# Patient Record
Sex: FEMALE | Race: WHITE | HISPANIC OR LATINO | Employment: UNEMPLOYED | URBAN - NONMETROPOLITAN AREA
[De-identification: names, ages, dates, MRNs, and addresses within clinical notes are randomized per-mention and may not be internally consistent; named-entity substitution may affect disease eponyms.]

---

## 2024-08-24 ENCOUNTER — HOSPITAL ENCOUNTER (EMERGENCY)
Facility: HOSPITAL | Age: 44
Discharge: HOME | End: 2024-08-24
Attending: FAMILY MEDICINE

## 2024-08-24 VITALS
OXYGEN SATURATION: 97 % | WEIGHT: 184 LBS | BODY MASS INDEX: 34.74 KG/M2 | DIASTOLIC BLOOD PRESSURE: 56 MMHG | HEART RATE: 108 BPM | HEIGHT: 61 IN | RESPIRATION RATE: 17 BRPM | SYSTOLIC BLOOD PRESSURE: 116 MMHG | TEMPERATURE: 99 F

## 2024-08-24 DIAGNOSIS — N12 PYELONEPHRITIS: Primary | ICD-10-CM

## 2024-08-24 LAB
ALBUMIN SERPL-MCNC: 4.7 G/DL (ref 3.4–5)
ALBUMIN/GLOB SERPL: 1.2 RATIO
ALP SERPL-CCNC: 92 UNIT/L (ref 50–144)
ALT SERPL-CCNC: 23 UNIT/L (ref 1–45)
ANION GAP SERPL CALC-SCNC: 10 MEQ/L (ref 2–13)
AST SERPL-CCNC: 28 UNIT/L (ref 14–36)
B-HCG UR QL: NEGATIVE
BACTERIA #/AREA URNS AUTO: ABNORMAL /HPF
BASOPHILS # BLD AUTO: 0.05 X10(3)/MCL (ref 0.01–0.08)
BASOPHILS NFR BLD AUTO: 0.3 % (ref 0.1–1.2)
BILIRUB SERPL-MCNC: 1.3 MG/DL (ref 0–1)
BILIRUB UR QL STRIP.AUTO: NEGATIVE
BUN SERPL-MCNC: 11 MG/DL (ref 7–20)
CALCIUM SERPL-MCNC: 9.5 MG/DL (ref 8.4–10.2)
CHLORIDE SERPL-SCNC: 102 MMOL/L (ref 98–110)
CLARITY UR: CLEAR
CO2 SERPL-SCNC: 25 MMOL/L (ref 21–32)
COLOR UR AUTO: ABNORMAL
CREAT SERPL-MCNC: 0.91 MG/DL (ref 0.66–1.25)
CREAT/UREA NIT SERPL: 12 (ref 12–20)
EOSINOPHIL # BLD AUTO: 0.04 X10(3)/MCL (ref 0.04–0.36)
EOSINOPHIL NFR BLD AUTO: 0.3 % (ref 0.7–7)
ERYTHROCYTE [DISTWIDTH] IN BLOOD BY AUTOMATED COUNT: 12.6 % (ref 11–14.5)
GFR SERPLBLD CREATININE-BSD FMLA CKD-EPI: 79 ML/MIN/1.73/M2
GLOBULIN SER-MCNC: 3.8 GM/DL (ref 2–3.9)
GLUCOSE SERPL-MCNC: 113 MG/DL (ref 70–115)
GLUCOSE UR QL STRIP: 100
HCT VFR BLD AUTO: 40.6 % (ref 36–48)
HGB BLD-MCNC: 13.6 G/DL (ref 11.8–16)
HGB UR QL STRIP: ABNORMAL
IMM GRANULOCYTES # BLD AUTO: 0.08 X10(3)/MCL (ref 0–0.03)
IMM GRANULOCYTES NFR BLD AUTO: 0.5 % (ref 0–0.5)
KETONES UR QL STRIP: 15
LEUKOCYTE ESTERASE UR QL STRIP: ABNORMAL
LIPASE SERPL-CCNC: 58 U/L (ref 23–300)
LYMPHOCYTES # BLD AUTO: 1.44 X10(3)/MCL (ref 1.16–3.74)
LYMPHOCYTES NFR BLD AUTO: 9 % (ref 20–55)
MCH RBC QN AUTO: 29 PG (ref 27–34)
MCHC RBC AUTO-ENTMCNC: 33.5 G/DL (ref 31–37)
MCV RBC AUTO: 86.6 FL (ref 79–99)
MONOCYTES # BLD AUTO: 0.38 X10(3)/MCL (ref 0.24–0.36)
MONOCYTES NFR BLD AUTO: 2.4 % (ref 4.7–12.5)
NEUTROPHILS # BLD AUTO: 13.95 X10(3)/MCL (ref 1.56–6.13)
NEUTROPHILS NFR BLD AUTO: 87.5 % (ref 37–73)
NITRITE UR QL STRIP: POSITIVE
NRBC BLD AUTO-RTO: 0 %
PH UR STRIP: 6 [PH]
PLATELET # BLD AUTO: 324 X10(3)/MCL (ref 140–371)
PMV BLD AUTO: 8.8 FL (ref 9.4–12.4)
POTASSIUM SERPL-SCNC: 3.6 MMOL/L (ref 3.5–5.1)
PROT SERPL-MCNC: 8.5 GM/DL (ref 6.3–8.2)
PROT UR QL STRIP: 100
RBC # BLD AUTO: 4.69 X10(6)/MCL (ref 4–5.1)
RBC #/AREA URNS AUTO: ABNORMAL /HPF
SODIUM SERPL-SCNC: 137 MMOL/L (ref 136–145)
SP GR UR STRIP.AUTO: 1.02 (ref 1–1.03)
SQUAMOUS #/AREA URNS AUTO: ABNORMAL /HPF
UROBILINOGEN UR STRIP-ACNC: 2
WBC # BLD AUTO: 15.94 X10(3)/MCL (ref 4–11.5)
WBC #/AREA URNS AUTO: ABNORMAL /HPF
YEAST UR QL AUTO: ABNORMAL /HPF

## 2024-08-24 PROCEDURE — 83690 ASSAY OF LIPASE: CPT | Performed by: FAMILY MEDICINE

## 2024-08-24 PROCEDURE — 81025 URINE PREGNANCY TEST: CPT | Performed by: FAMILY MEDICINE

## 2024-08-24 PROCEDURE — 81003 URINALYSIS AUTO W/O SCOPE: CPT | Performed by: FAMILY MEDICINE

## 2024-08-24 PROCEDURE — 87086 URINE CULTURE/COLONY COUNT: CPT | Performed by: FAMILY MEDICINE

## 2024-08-24 PROCEDURE — 96375 TX/PRO/DX INJ NEW DRUG ADDON: CPT

## 2024-08-24 PROCEDURE — 81015 MICROSCOPIC EXAM OF URINE: CPT | Performed by: FAMILY MEDICINE

## 2024-08-24 PROCEDURE — 87040 BLOOD CULTURE FOR BACTERIA: CPT | Performed by: FAMILY MEDICINE

## 2024-08-24 PROCEDURE — 96365 THER/PROPH/DIAG IV INF INIT: CPT

## 2024-08-24 PROCEDURE — 25500020 PHARM REV CODE 255: Performed by: FAMILY MEDICINE

## 2024-08-24 PROCEDURE — 63600175 PHARM REV CODE 636 W HCPCS: Performed by: FAMILY MEDICINE

## 2024-08-24 PROCEDURE — 99285 EMERGENCY DEPT VISIT HI MDM: CPT | Mod: 25

## 2024-08-24 PROCEDURE — 85025 COMPLETE CBC W/AUTO DIFF WBC: CPT | Performed by: FAMILY MEDICINE

## 2024-08-24 PROCEDURE — 25000003 PHARM REV CODE 250: Performed by: FAMILY MEDICINE

## 2024-08-24 PROCEDURE — 80053 COMPREHEN METABOLIC PANEL: CPT | Performed by: FAMILY MEDICINE

## 2024-08-24 RX ORDER — ONDANSETRON HYDROCHLORIDE 2 MG/ML
4 INJECTION, SOLUTION INTRAVENOUS
Status: COMPLETED | OUTPATIENT
Start: 2024-08-24 | End: 2024-08-24

## 2024-08-24 RX ORDER — NITROFURANTOIN 25; 75 MG/1; MG/1
100 CAPSULE ORAL 2 TIMES DAILY
Qty: 10 CAPSULE | Refills: 0 | Status: SHIPPED | OUTPATIENT
Start: 2024-08-24 | End: 2024-08-29

## 2024-08-24 RX ORDER — ACETAMINOPHEN 500 MG
1000 TABLET ORAL
Status: COMPLETED | OUTPATIENT
Start: 2024-08-24 | End: 2024-08-24

## 2024-08-24 RX ORDER — KETOROLAC TROMETHAMINE 30 MG/ML
30 INJECTION, SOLUTION INTRAMUSCULAR; INTRAVENOUS
Status: COMPLETED | OUTPATIENT
Start: 2024-08-24 | End: 2024-08-24

## 2024-08-24 RX ORDER — ONDANSETRON 4 MG/1
4 TABLET, ORALLY DISINTEGRATING ORAL EVERY 8 HOURS PRN
Qty: 20 TABLET | Refills: 0 | Status: SHIPPED | OUTPATIENT
Start: 2024-08-24

## 2024-08-24 RX ORDER — CEFTRIAXONE 1 G/1
1 INJECTION, POWDER, FOR SOLUTION INTRAMUSCULAR; INTRAVENOUS
Status: DISCONTINUED | OUTPATIENT
Start: 2024-08-24 | End: 2024-08-24

## 2024-08-24 RX ADMIN — CEFTRIAXONE SODIUM 1 G: 1 INJECTION, POWDER, FOR SOLUTION INTRAMUSCULAR; INTRAVENOUS at 05:08

## 2024-08-24 RX ADMIN — ONDANSETRON 4 MG: 2 INJECTION INTRAMUSCULAR; INTRAVENOUS at 04:08

## 2024-08-24 RX ADMIN — IOHEXOL 100 ML: 300 INJECTION, SOLUTION INTRAVENOUS at 04:08

## 2024-08-24 RX ADMIN — KETOROLAC TROMETHAMINE 30 MG: 30 INJECTION, SOLUTION INTRAMUSCULAR at 03:08

## 2024-08-24 RX ADMIN — ACETAMINOPHEN 1000 MG: 500 TABLET ORAL at 04:08

## 2024-08-24 NOTE — ED PROVIDER NOTES
Encounter Date: 8/24/2024       History       Chief Complaint  Patient presents with  · Abdominal Pain  · Nausea    Pt ambulatory to triage with c/o right upper quadrant  abd pain and nausea, onset this am.  Patient complains of abdominal pain which started this a.m. she is complaining of nausea no associated vomiting she is tender in the right upper quadrant possible cholelithiasis she has a white cell count elevation          Review of patient's allergies indicates:  No Known Allergies  History reviewed. No pertinent past medical history.  Past Surgical History:   Procedure Laterality Date    TUBAL LIGATION       No family history on file.  Social History     Tobacco Use    Smoking status: Never     Passive exposure: Never    Smokeless tobacco: Never   Substance Use Topics    Alcohol use: Not Currently    Drug use: Never     Review of Systems   Constitutional:  Negative for fever.   HENT:  Negative for congestion, dental problem, drooling, ear discharge and sore throat.    Eyes:  Negative for pain, discharge, redness and itching.   Respiratory:  Negative for apnea, chest tightness and shortness of breath.    Cardiovascular:  Negative for chest pain.   Gastrointestinal:  Positive for abdominal pain and nausea.   Endocrine: Negative for cold intolerance and heat intolerance.   Genitourinary:  Negative for difficulty urinating, dysuria and enuresis.   Musculoskeletal:  Negative for back pain.   Skin:  Negative for rash.   Neurological:  Negative for dizziness, facial asymmetry, weakness, light-headedness, numbness and headaches.   Hematological:  Does not bruise/bleed easily.   Psychiatric/Behavioral:  Negative for agitation, behavioral problems, confusion, decreased concentration and dysphoric mood.        Physical Exam     Initial Vitals [08/24/24 1500]   BP Pulse Resp Temp SpO2   101/72 (!) 114 20 97.8 °F (36.6 °C) 100 %      MAP       --         Physical Exam    Nursing note and vitals reviewed.  Constitutional:  She appears well-developed.   Eyes: Pupils are equal, round, and reactive to light.   Neck:   Normal range of motion.  Cardiovascular:  Normal rate, regular rhythm, normal heart sounds and intact distal pulses.           Pulmonary/Chest: Breath sounds normal.   Abdominal: Abdomen is soft.   Positive tenderness in the right upper quadrant   Musculoskeletal:         General: Normal range of motion.      Cervical back: Normal range of motion.     Skin: Skin is warm.   Psychiatric: She has a normal mood and affect.         ED Course   Procedures  Labs Reviewed   URINALYSIS, REFLEX TO URINE CULTURE - Abnormal       Result Value    Color, UA Orange (*)     Appearance, UA Clear      Specific Gravity, UA 1.025      pH, UA 6.0      Protein,  (*)     Glucose,  (*)     Ketones, UA 15 (*)     Blood, UA Moderate (*)     Bilirubin, UA Negative      Urobilinogen, UA 2.0 (*)     Nitrites, UA Positive (*)     Leukocyte Esterase, UA Trace (*)     Narrative:      URINE STABILITY IS 2 HOURS AT ROOM TEMP OR    SIX HOURS REFRIGERATED. PERFORMING TESTING ON    SPECIMENS GREATER THAN THIS AGE MAY AFFECT THE    FOLLOWING TESTS:    PH          SPECIFIC GRAVITY           BLOOD    CLARITY     BILIRUBIN               UROBILINOGEN   URINALYSIS, MICROSCOPIC - Abnormal    Bacteria, UA Moderate (*)     Yeast, UA Few (*)     RBC, UA 0-5      WBC, UA 11-20 (*)     Squamous Epithelial Cells, UA Few (*)    COMPREHENSIVE METABOLIC PANEL - Abnormal    Sodium 137      Potassium 3.6      Chloride 102      CO2 25      Glucose 113      Blood Urea Nitrogen 11      Creatinine 0.91      Calcium 9.5      Protein Total 8.5 (*)     Albumin 4.7      Globulin 3.8      Albumin/Globulin Ratio 1.2      Bilirubin Total 1.3 (*)     ALP 92      ALT 23      AST 28      eGFR 79      Anion Gap 10.0      BUN/Creatinine Ratio 12     CBC WITH DIFFERENTIAL - Abnormal    WBC 15.94 (*)     RBC 4.69      Hgb 13.6      Hct 40.6      MCV 86.6      MCH 29.0      MCHC 33.5       RDW 12.6      Platelet 324      MPV 8.8 (*)     Neut % 87.5 (*)     Lymph % 9.0 (*)     Mono % 2.4 (*)     Eos % 0.3 (*)     Basophil % 0.3      Lymph # 1.44      Neut # 13.95 (*)     Mono # 0.38 (*)     Eos # 0.04      Baso # 0.05      IG# 0.08 (*)     IG% 0.5      NRBC% 0.0     HCG QUALITATIVE URINE - Normal    hCG Qualitative, Urine Negative     LIPASE - Normal    Lipase Level 58     CULTURE, URINE   BLOOD CULTURE [facility]   BLOOD CULTURE [facility]   CBC W/ AUTO DIFFERENTIAL    Narrative:     The following orders were created for panel order CBC auto differential.  Procedure                               Abnormality         Status                     ---------                               -----------         ------                     CBC with Differential[[id number]]       Abnormal            Final result                 Please view results for these tests on the individual orders.          Imaging Results              US Abdomen Limited (Final result)  Result time 08/24/24 17:55:18      Final result by Cristian Portillo MD (08/24/24 17:55:18)                   Impression:        1. No acute process is identified.  2. Atrophic right kidney with findings consistent with medical disease and 1.1 cm cyst in lower pole.  3. Hepatic steatosis.      Electronically signed by: Cristian Portillo MD  Date:    08/24/2024  Time:    17:55               Narrative:      RIGHT UPPER QUADRANT ULTRASOUND:    CPT: 79071    HISTORY: Abdominal pain more prominent on the right.    Comparison:    FINDINGS:    Multiple transverse and sagittal grayscale sonographic images were acquired through the abdomen. Artifact from bowel gas and body habitus limits this exam with the pancreas mostly obscured and the liver partially obscured.  The visualized portion of the liver measures 13.4 cm in the craniocaudal midclavicular line.  The visualized liver is diffusely increased in echotexture. This increased echotexture decreases sensitivity to detect focal  lesions due to decreased penetration. No focal lesions are identified in the partially visualized liver parenchyma.  The main portal vein is patent with hepatopetal flow.  The gallbladder is unremarkable, and there is no pericholecystic fluid, gall bladder wall thickening, or sonographic Melendez's sign. The common bile duct is unremarkable and measures 0.35 cm in its greatest AP diameter. The partially visualized pancreatic parenchyma is unremarkable.  The right kidney is atrophic with a subcentimeter anechoic cyst in the lower pole.  There is no right hydronephrosis.  The right kidney measures  cm in length.                                       CT Abdomen Pelvis With IV Contrast NO Oral Contrast (Final result)  Result time 08/24/24 16:36:57      Final result by Cristian Portillo MD (08/24/24 16:36:57)                   Impression:        1. No hyperdense nephroureterolithiasis or hydroureteronephrosis is present.  2. Hyperemia and enhancement of the walls of the right kidney collecting system and ureter is suspicious for a infectious processes versus recent passage of a calculus.  3. Atrophic right kidney with prominent scarring.  4. 0.7 cm simple cyst in the inferior right kidney with 2 sub-5 mm is low-density cystic foci in the left kidney that are too small to further characterize.  5. 1.0 cm low-density cystic focus in the region of the cervix is statistically most likely a nabothian cyst, but other etiologies cannot be entirely excluded.  6. Moderate to large size hiatal hernia extending cephalad out of the field of view.      Electronically signed by: Cristian Portillo MD  Date:    08/24/2024  Time:    16:36               Narrative:      CT SCAN OF ABDOMEN AND PELVIS WITH CONTRAST:    CPT 59783    Total DLP: 520.2 mGy-cm. Automatic exposure control was utilized to limit the radiation dose to the patient.  Total contrast: 100 mL in unspecified peripheral vein.      History: Acute onset of right lower quadrant  abdominal and right-sided pelvic pain with nausea.    Comparison:    Technique: Multiple contiguous axial images were acquired from the base of the chest to the femoral trochanters with intravenous contrast administration. Oral contrast was not administered.    Findings:  The partially visualized bases of the lungs and heart are unremarkable.  There is a moderate to large hiatal hernia extending cephalad out of the field of view.  Hepatic steatosis is suspected but cannot be excluded without noncontrast images.  The right kidney is atrophic with scarring by with a renal nephrograms still present.  There is less prominent scarring of the left kidney.  There is a 0.7 cm low-density cyst in the lower pole of the right kidney.  There are 2 sub 5 mm low-density cystic foci in the left kidney that are too small to further characterize.  No hyperdense nephroureterolithiasis or hydroureteronephrosis is present.  There is hyperemia and enhancement of the right kidney collecting system an ureteral walls with very subtle surrounding stranding.  There is a 1.0 cm low-density cystic focus in the region of the cervix.  The bowel gas pattern is nonspecific.  The appendix is unremarkable.  The other organs in the abdomen and pelvis are grossly unremarkable. There is no free fluid, focal fluid collections, free air, or significant mesenteric inflammatory stranding.                                         Medications   ketorolac injection 30 mg (30 mg Intravenous Given 8/24/24 1534)   iohexoL (OMNIPAQUE 300) injection 100 mL (100 mLs Intravenous Given 8/24/24 1602)   acetaminophen tablet 1,000 mg (1,000 mg Oral Given 8/24/24 1614)   ondansetron injection 4 mg (4 mg Intravenous Given 8/24/24 1614)   cefTRIAXone (Rocephin) 1 g in D5W 100 mL IVPB (MB+) (0 g Intravenous Stopped 8/24/24 1801)     Medical Decision Making  Pt is having tenderness in the right upper quadrant and I did not see any comment on the on the gallbladder I called  Dr. Bates and he recommend that we see only about 25% of the stones on the CT scan and he recommended that we do the ultrasound of the right upper quadrant to rule out cholelithiasis and I have ordered the same    Amount and/or Complexity of Data Reviewed  Labs: ordered.  Radiology: ordered.    Risk  OTC drugs.  Prescription drug management.                                      Clinical Impression:  Final diagnoses:  [N12] Pyelonephritis (Primary)          ED Disposition Condition    Discharge Good          ED Prescriptions       Medication Sig Dispense Start Date End Date Auth. Provider    nitrofurantoin, macrocrystal-monohydrate, (MACROBID) 100 MG capsule Take 1 capsule (100 mg total) by mouth 2 (two) times daily. for 5 days 10 capsule 8/24/2024 8/29/2024 Marshal Jones MD    ondansetron (ZOFRAN-ODT) 4 MG TbDL Take 1 tablet (4 mg total) by mouth every 8 (eight) hours as needed. 20 tablet 8/24/2024 -- Marshal Jones MD          Follow-up Information       Follow up With Specialties Details Why Contact Info    PCP  Call in 2 days               Marshal Jones MD  08/24/24 [id number]

## 2024-08-24 NOTE — Clinical Note
"Deborah Castellanos" Selvin Leonardo was seen and treated in our emergency department on 8/24/2024.  She may return to work on 08/26/2024.       If you have any questions or concerns, please don't hesitate to call.      Marshal Jones MD"

## 2024-08-27 LAB — BACTERIA UR CULT: ABNORMAL

## 2024-08-29 LAB
BACTERIA BLD CULT: NORMAL
BACTERIA BLD CULT: NORMAL